# Patient Record
Sex: MALE
[De-identification: names, ages, dates, MRNs, and addresses within clinical notes are randomized per-mention and may not be internally consistent; named-entity substitution may affect disease eponyms.]

---

## 2018-01-23 ENCOUNTER — HOSPITAL ENCOUNTER (EMERGENCY)
Dept: HOSPITAL 5 - ED | Age: 43
Discharge: HOME | End: 2018-01-23
Payer: SELF-PAY

## 2018-01-23 VITALS — SYSTOLIC BLOOD PRESSURE: 121 MMHG | DIASTOLIC BLOOD PRESSURE: 70 MMHG

## 2018-01-23 DIAGNOSIS — R55: Primary | ICD-10-CM

## 2018-01-23 DIAGNOSIS — J01.10: ICD-10-CM

## 2018-01-23 DIAGNOSIS — J01.00: ICD-10-CM

## 2018-01-23 LAB
BASOPHILS # (AUTO): 0.1 K/MM3 (ref 0–0.1)
BASOPHILS NFR BLD AUTO: 0.5 % (ref 0–1.8)
BUN SERPL-MCNC: 18 MG/DL (ref 9–20)
BUN/CREAT SERPL: 18 %
CALCIUM SERPL-MCNC: 8.7 MG/DL (ref 8.4–10.2)
EOSINOPHIL # BLD AUTO: 0.1 K/MM3 (ref 0–0.4)
EOSINOPHIL NFR BLD AUTO: 0.9 % (ref 0–4.3)
HCT VFR BLD CALC: 45.2 % (ref 35.5–45.6)
HEMOLYSIS INDEX: 11
HGB BLD-MCNC: 14.8 GM/DL (ref 11.8–15.2)
LYMPHOCYTES # BLD AUTO: 1.6 K/MM3 (ref 1.2–5.4)
LYMPHOCYTES NFR BLD AUTO: 13.8 % (ref 13.4–35)
MCH RBC QN AUTO: 27 PG (ref 28–32)
MCHC RBC AUTO-ENTMCNC: 33 % (ref 32–34)
MCV RBC AUTO: 83 FL (ref 84–94)
MONOCYTES # (AUTO): 0.6 K/MM3 (ref 0–0.8)
MONOCYTES % (AUTO): 5.3 % (ref 0–7.3)
PLATELET # BLD: 256 K/MM3 (ref 140–440)
RBC # BLD AUTO: 5.43 M/MM3 (ref 3.65–5.03)

## 2018-01-23 PROCEDURE — 93010 ELECTROCARDIOGRAM REPORT: CPT

## 2018-01-23 PROCEDURE — 85025 COMPLETE CBC W/AUTO DIFF WBC: CPT

## 2018-01-23 PROCEDURE — 93005 ELECTROCARDIOGRAM TRACING: CPT

## 2018-01-23 PROCEDURE — 71046 X-RAY EXAM CHEST 2 VIEWS: CPT

## 2018-01-23 PROCEDURE — 36415 COLL VENOUS BLD VENIPUNCTURE: CPT

## 2018-01-23 PROCEDURE — 99285 EMERGENCY DEPT VISIT HI MDM: CPT

## 2018-01-23 PROCEDURE — 84484 ASSAY OF TROPONIN QUANT: CPT

## 2018-01-23 PROCEDURE — 70450 CT HEAD/BRAIN W/O DYE: CPT

## 2018-01-23 PROCEDURE — 80048 BASIC METABOLIC PNL TOTAL CA: CPT

## 2018-01-23 NOTE — XRAY REPORT
ROUTINE CHEST, TWO VIEWS:



HISTORY:  Dizziness.



The trachea, heart, mediastinal contour, lung fields and bony thorax 

are unremarkable.



IMPRESSION:

Unremarkable chest x-ray.

## 2018-01-23 NOTE — CAT SCAN REPORT
Cranial CT without contrast.



History: Dizziness and syncope.



Findings: The brain parenchyma is normal. There is no evidence of 

hemorrhage, infarct, or mass. No extra-axial collections are seen. The 

ventricles are normal in size and contour. The posterior fossa is 

normal.



The calvarium is intact. There is extensive mucoperiosteal thickening 

in the right maxillary and right ethmoid sinuses with less severe 

involvement of the left maxillary and left ethmoid sinuses. The mastoid 

air cells are normal.



Impression: 1. No intracranial abnormalities.



2. Chronic maxillary and ethmoid sinusitis.

## 2018-01-23 NOTE — EMERGENCY DEPARTMENT REPORT
HPI





- General


Chief Complaint: Dizziness


Time Seen by Provider: 01/23/18 14:16





- HPI


HPI: 





Patient reports that dizziness and dehydration.  Patient states that he has had 

flu symptoms since 2 weeks and he has been taking medication and he feels 

better but he started getting dizzy yesterday.  He states that he was standing 

up and he felt like the room was spinning around.  Reports productive cough 

with yellow phlegm with sometimes specks of blood.  Patient's that I feel like 

the room is spinning.  Doesn't complain of muscle cramping in his legs.  He 

said he passed out this morning in front of his wife.  Report nausea and 

vomiting.  Positive fever preparation which has gone away now he is also 

complaining a headache since yesterday.  Pain is located to the front of his 

head and his facial area and ate at a 10 achy.` Denies any back or abdominal 

pain.  Denies any neck pain or stiffness.  Denies any chest pain or shortness 

of breath.





ED Past Medical Hx





- Past Medical History


Previous Medical History?: No





- Surgical History


Past Surgical History?: No





- Family History


Family history: no significant





- Social History


Smoking Status: Never Smoker


Substance Use Type: None





- Medications


Home Medications: 


 Home Medications











 Medication  Instructions  Recorded  Confirmed  Last Taken  Type


 


Amoxicillin/K Clav Tab [Augmentin 1 tab PO Q12HR 10 Days #20 tab 01/23/18  

Unknown Rx





875 mg]     


 


Cetirizine HCl [ZyrTEC] 10 mg PO QAM 14 Days #14 tab.rapdis 01/23/18  Unknown Rx


 


Fluticasone [Flonase] 1 spray NS QDAY 14 Days #1 bottle 01/23/18  Unknown Rx


 


Meclizine [Antivert] 25 mg PO TID PRN 4 Days #12 tablet 01/23/18  Unknown Rx


 


Promethazine HCl/Codeine 5 ml PO Q8H PRN #75 ml 01/23/18  Unknown Rx





[Promethazine-Codeine Syrup]     














ED Review of Systems


ROS: 


Stated complaint: FLU LIKE SYMPTOMS


Other details as noted in HPI





Comment: All other systems reviewed and negative


Constitutional: fever


Eyes: denies: vision change


ENT: denies: ear pain, throat pain, congestion


Respiratory: cough.  denies: orthopnea, shortness of breath, SOB with exertion, 

SOB at rest, stridor, wheezing


Cardiovascular: denies: chest pain, palpitations, dyspnea on exertion, orthopnea

, edema, syncope, paroxysmal nocturnal dyspnea


Gastrointestinal: nausea, vomiting.  denies: abdominal pain, diarrhea, 

constipation, hematemesis, melena, hematochezia


Genitourinary: denies: dysuria, frequency, hematuria, discharge


Musculoskeletal: myalgia.  denies: back pain, joint swelling, arthralgia


Skin: denies: rash


Neurological: headache, vertigo.  denies: weakness, numbness, paresthesias, 

confusion, abnormal gait





Physical Exam





- Physical Exam


Vital Signs: 


 Vital Signs











  01/23/18





  08:11


 


Temperature 97.6 F


 


Pulse Rate 52 L


 


Respiratory 18





Rate 


 


Blood Pressure 115/78


 


O2 Sat by Pulse 98





Oximetry 











General: 





This is a 43-year-old male well-nourished well-developed in no acute distress 

and nontoxic in appearance


Physical Exam: 





Head: Normocephalic, atraumatic, no abrasion, no bruising and no contusion.


Eyes: Biateral pupils equal and reactive to light, bilateral EOM intact.. 

Bilateral conjunctival and sclera without injection, normal accommodation.  No 

nystagmus


Mouth: Moist, no pharyngeal exudate or erythema.  No peritonsillar abscesses.  

Uvula is midline and oral airways patent.


Ears: Christian TM congested without erythema.  Bilateral EAC without any redness 

swelling or drainage.  No mastoid bone tenderness


Nose: Bilateral nasal turbinates congested with erythema and clear drainage.  

Maxillary and frontal sinuses non-tender to palpate.


Neck: Supple, No  Cervical adenopathy, full range of motion and no C-spine 

tenderness.  No swelling or tracheal deviation normal reflexes


Cardiovascular: S1, S2.  Regular rate and rhythm.  No murmur.  Capillary refill 

is less then 3 seconds.


Lungs: Clear to auscultate bilaterally.  No rhonchi, wheezes or rales.  No 

chest wall tenderness.  No chest contusion.  No bruising to chest.


Abdomen: Non-tender to palpate in all quadrants, no guarding or rebound 

tenderness, positive bowel sounds in all quadrants.  No CVA tenderness.  No 

hernia, bruit or mass.  No rigidity or distention.


Extremities: No clubbing, cyanosis or edema.  +2 pulses.  No neurovascular 

compromise


Skin: Clean, dry and intact.  No rash or lesions.


Neurological: GCS at 15, Pt is alert and oriented 3 speech is clear period.  

Bilateral hand  strong and equal.  Normal gait.  Negative Romberg and no 

pronator drift. Normal Reflexes.  No motor or sensory deficit


Back: No vertebral tenderness, no paraspinal tenderness.  normal inspection


Psych: Normal mood and behavior





ED Course


 Vital Signs











  01/23/18





  08:11


 


Temperature 97.6 F


 


Pulse Rate 52 L


 


Respiratory 18





Rate 


 


Blood Pressure 115/78


 


O2 Sat by Pulse 98





Oximetry 








 Vital Signs











  01/23/18 01/23/18





  08:11 14:57


 


Temperature 97.6 F 


 


Pulse Rate 52 L 55 L


 


Respiratory 18 16





Rate  


 


Blood Pressure 115/78 


 


Blood Pressure  121/70





[Left]  


 


O2 Sat by Pulse 98 100





Oximetry  














- Reevaluation(s)


Reevaluation #1: 





01/23/18 15:43


Patient orally hydrated in emergency room. Feels better. VSS, afeb.





ED Medical Decision Making





- Lab Data


Result diagrams: 


 01/23/18 09:23





 01/23/18 09:23





 Lab Results











  01/23/18 01/23/18 01/23/18 Range/Units





  09:23 09:23 11:10 


 


WBC  11.5 H    (4.5-11.0)  K/mm3


 


RBC  5.43 H    (3.65-5.03)  M/mm3


 


Hgb  14.8    (11.8-15.2)  gm/dl


 


Hct  45.2    (35.5-45.6)  %


 


MCV  83 L    (84-94)  fl


 


MCH  27 L    (28-32)  pg


 


MCHC  33    (32-34)  %


 


RDW  13.5    (13.2-15.2)  %


 


Plt Count  256    (140-440)  K/mm3


 


Lymph % (Auto)  13.8    (13.4-35.0)  %


 


Mono % (Auto)  5.3    (0.0-7.3)  %


 


Eos % (Auto)  0.9    (0.0-4.3)  %


 


Baso % (Auto)  0.5    (0.0-1.8)  %


 


Lymph #  1.6    (1.2-5.4)  K/mm3


 


Mono #  0.6    (0.0-0.8)  K/mm3


 


Eos #  0.1    (0.0-0.4)  K/mm3


 


Baso #  0.1    (0.0-0.1)  K/mm3


 


Seg Neutrophils %  79.5 H    (40.0-70.0)  %


 


Seg Neutrophils #  9.1 H    (1.8-7.7)  K/mm3


 


Sodium   137   (137-145)  mmol/L


 


Potassium   4.3   (3.6-5.0)  mmol/L


 


Chloride   99.6   ()  mmol/L


 


Carbon Dioxide   27   (22-30)  mmol/L


 


Anion Gap   15   mmol/L


 


BUN   18   (9-20)  mg/dL


 


Creatinine   1.0   (0.8-1.5)  mg/dL


 


Estimated GFR   > 60   ml/min


 


BUN/Creatinine Ratio   18   %


 


Glucose   151 H   ()  mg/dL


 


Calcium   8.7   (8.4-10.2)  mg/dL


 


Troponin T   < 0.010  < 0.010  (0.00-0.029)  ng/mL














  01/23/18 Range/Units





  14:43 


 


WBC   (4.5-11.0)  K/mm3


 


RBC   (3.65-5.03)  M/mm3


 


Hgb   (11.8-15.2)  gm/dl


 


Hct   (35.5-45.6)  %


 


MCV   (84-94)  fl


 


MCH   (28-32)  pg


 


MCHC   (32-34)  %


 


RDW   (13.2-15.2)  %


 


Plt Count   (140-440)  K/mm3


 


Lymph % (Auto)   (13.4-35.0)  %


 


Mono % (Auto)   (0.0-7.3)  %


 


Eos % (Auto)   (0.0-4.3)  %


 


Baso % (Auto)   (0.0-1.8)  %


 


Lymph #   (1.2-5.4)  K/mm3


 


Mono #   (0.0-0.8)  K/mm3


 


Eos #   (0.0-0.4)  K/mm3


 


Baso #   (0.0-0.1)  K/mm3


 


Seg Neutrophils %   (40.0-70.0)  %


 


Seg Neutrophils #   (1.8-7.7)  K/mm3


 


Sodium   (137-145)  mmol/L


 


Potassium   (3.6-5.0)  mmol/L


 


Chloride   ()  mmol/L


 


Carbon Dioxide   (22-30)  mmol/L


 


Anion Gap   mmol/L


 


BUN   (9-20)  mg/dL


 


Creatinine   (0.8-1.5)  mg/dL


 


Estimated GFR   ml/min


 


BUN/Creatinine Ratio   %


 


Glucose   ()  mg/dL


 


Calcium   (8.4-10.2)  mg/dL


 


Troponin T  < 0.010  (0.00-0.029)  ng/mL














- EKG Data


-: EKG Interpreted by Me (Attending physician)


Rate: bradycardia (42 BPM)





- EKG Data


Interpretation: no acute changes





- Radiology Data


Radiology results: report reviewed





CXR no acute findings


Critical care attestation.: 


If time is entered above; I have spent that time in minutes in the direct care 

of this critically ill patient, excluding procedure time.








ED Disposition


Clinical Impression: 


 Vertigo, Cough in adult, Syncope and collapse





Sinusitis, acute


Qualifiers:


 Sinusitis location: unspecified location Recurrence: not specified as 

recurrent Qualified Code(s): J01.90 - Acute sinusitis, unspecified





Disposition: DC-01 TO HOME OR SELFCARE


Is pt being admited?: No


Does the pt Need Aspirin: No


Condition: Stable


Instructions:  Sinusitis (ED), Syncope (ED), Musculoskeletal Pain (ED), Acute 

Cough (ED)


Additional Instructions: 


Increase your fluid intake 2 to 3 liters daily to include water, Gatorade


Take medication as prescribed


You have vertigo which is more likely from your chronic sinus infection.


Take for Zyrtec and Flonase as prescribed


Follow up with PCP in 24 hours and if he do not have a primary care as she can 

follow up with outside [facility]





Prescriptions: 


Amoxicillin/K Clav Tab [Augmentin 875 mg] 1 tab PO Q12HR 10 Days #20 tab


Cetirizine HCl [ZyrTEC] 10 mg PO QAM 14 Days #14 tab.rapdis


Fluticasone [Flonase] 1 spray NS QDAY 14 Days #1 bottle


Meclizine [Antivert] 25 mg PO TID PRN 4 Days #12 tablet


 PRN Reason: Vertigo


Promethazine HCl/Codeine [Promethazine-Codeine Syrup] 5 ml PO Q8H PRN #75 ml


 PRN Reason: Cough


Referrals: 


PRIMARY CARE,MD [Primary Care Provider] - 24 Hours


[facility] [Outside] - 01/24/18


Forms:  Work/School Release Form(ED)